# Patient Record
Sex: FEMALE | NOT HISPANIC OR LATINO | URBAN - METROPOLITAN AREA
[De-identification: names, ages, dates, MRNs, and addresses within clinical notes are randomized per-mention and may not be internally consistent; named-entity substitution may affect disease eponyms.]

---

## 2019-01-24 ENCOUNTER — APPOINTMENT (RX ONLY)
Dept: URBAN - METROPOLITAN AREA CLINIC 39 | Facility: CLINIC | Age: 29
Setting detail: DERMATOLOGY
End: 2019-01-24

## 2019-01-24 DIAGNOSIS — D22 MELANOCYTIC NEVI: ICD-10-CM

## 2019-01-24 DIAGNOSIS — L50.8 OTHER URTICARIA: ICD-10-CM

## 2019-01-24 PROBLEM — K21.9 GASTRO-ESOPHAGEAL REFLUX DISEASE WITHOUT ESOPHAGITIS: Status: ACTIVE | Noted: 2019-01-24

## 2019-01-24 PROBLEM — J45.909 UNSPECIFIED ASTHMA, UNCOMPLICATED: Status: ACTIVE | Noted: 2019-01-24

## 2019-01-24 PROBLEM — D22.5 MELANOCYTIC NEVI OF TRUNK: Status: ACTIVE | Noted: 2019-01-24

## 2019-01-24 PROBLEM — L29.8 OTHER PRURITUS: Status: ACTIVE | Noted: 2019-01-24

## 2019-01-24 PROBLEM — L70.0 ACNE VULGARIS: Status: ACTIVE | Noted: 2019-01-24

## 2019-01-24 PROBLEM — F32.9 MAJOR DEPRESSIVE DISORDER, SINGLE EPISODE, UNSPECIFIED: Status: ACTIVE | Noted: 2019-01-24

## 2019-01-24 PROCEDURE — 99202 OFFICE O/P NEW SF 15 MIN: CPT

## 2019-01-24 NOTE — HPI: HIVES (URTICARIA)
How Severe Are Your Hives?: mild
Is This A New Presentation, Or A Follow-Up?: Hives
Additional History: It began last year after construction was done at her house. The allergist told her it was from her brothers dog where she was staying while the construction was being done. The allergist could not do needle testing. A blood test revealed a pet dander allergy.

## 2020-07-20 ENCOUNTER — APPOINTMENT (OUTPATIENT)
Age: 30
Setting detail: DERMATOLOGY
End: 2020-07-20

## 2020-07-20 VITALS — TEMPERATURE: 97.9 F

## 2020-07-20 DIAGNOSIS — L64.8 OTHER ANDROGENIC ALOPECIA: ICD-10-CM

## 2020-07-20 DIAGNOSIS — Z86.19 PERSONAL HISTORY OF OTHER INFECTIOUS AND PARASITIC DISEASES: ICD-10-CM | Status: RESOLVED

## 2020-07-20 DIAGNOSIS — D22 MELANOCYTIC NEVI: ICD-10-CM

## 2020-07-20 DIAGNOSIS — L50.8 OTHER URTICARIA: ICD-10-CM | Status: WELL CONTROLLED

## 2020-07-20 DIAGNOSIS — Z41.9 ENCOUNTER FOR PROCEDURE FOR PURPOSES OTHER THAN REMEDYING HEALTH STATE, UNSPECIFIED: ICD-10-CM

## 2020-07-20 DIAGNOSIS — L90.5 SCAR CONDITIONS AND FIBROSIS OF SKIN: ICD-10-CM

## 2020-07-20 PROBLEM — D22.5 MELANOCYTIC NEVI OF TRUNK: Status: ACTIVE | Noted: 2020-07-20

## 2020-07-20 PROCEDURE — ? TREATMENT REGIMEN

## 2020-07-20 PROCEDURE — ? RECOMMENDATIONS

## 2020-07-20 PROCEDURE — ? ORDER TESTS

## 2020-07-20 PROCEDURE — 99202 OFFICE O/P NEW SF 15 MIN: CPT

## 2020-07-20 PROCEDURE — ? COUNSELING

## 2020-07-20 PROCEDURE — ? MEDICAL CONSULTATION: LHR

## 2020-07-20 PROCEDURE — ? ALMA LASER

## 2020-07-20 PROCEDURE — ? ADDITIONAL NOTES

## 2020-07-20 ASSESSMENT — LOCATION SIMPLE DESCRIPTION DERM
LOCATION SIMPLE: ANTERIOR SCALP
LOCATION SIMPLE: RIGHT LOWER BACK
LOCATION SIMPLE: CHEST
LOCATION SIMPLE: RIGHT PRETIBIAL REGION
LOCATION SIMPLE: SUBMENTAL CHIN
LOCATION SIMPLE: LEFT FOREARM
LOCATION SIMPLE: ABDOMEN
LOCATION SIMPLE: RIGHT UPPER BACK
LOCATION SIMPLE: GROIN

## 2020-07-20 ASSESSMENT — LOCATION DETAILED DESCRIPTION DERM
LOCATION DETAILED: LEFT PROXIMAL DORSAL FOREARM
LOCATION DETAILED: RIGHT PROXIMAL PRETIBIAL REGION
LOCATION DETAILED: SUBMENTAL CHIN
LOCATION DETAILED: MONS PUBIS
LOCATION DETAILED: MID-FRONTAL SCALP
LOCATION DETAILED: RIGHT LATERAL SUPERIOR CHEST
LOCATION DETAILED: RIGHT MID-UPPER BACK
LOCATION DETAILED: PERIUMBILICAL SKIN
LOCATION DETAILED: RIGHT INFERIOR LATERAL LOWER BACK

## 2020-07-20 ASSESSMENT — LOCATION ZONE DERM
LOCATION ZONE: ARM
LOCATION ZONE: SCALP
LOCATION ZONE: LEG
LOCATION ZONE: TRUNK
LOCATION ZONE: FACE
LOCATION ZONE: TRUNK
LOCATION ZONE: VULVA

## 2020-07-20 NOTE — PROCEDURE: TREATMENT REGIMEN
Detail Level: Zone
Otc Regimen: Rogaine 5% foam once daily
Plan: Patient will try OTC foam/ solution first. She may call for  prescription ointment form.
Otc Regimen: Antihistamine daily.

## 2020-07-20 NOTE — PROCEDURE: RECOMMENDATIONS
Recommendation Preamble: The following recommendations were made during the visit: silicone gel OTC
Detail Level: Zone

## 2020-07-20 NOTE — HPI: COSMETIC CONSULTATION
When Outside In The Sun, Do You...: mostly tans, rarely burns
Additional History: H/o Chronic Urticaria, Dr. Elliott recommended LHR test spot. \\n

## 2020-07-20 NOTE — PROCEDURE: ALMA LASER
Repetition Rate (Hz): 2
Number Of Pulses: 1
Post-Care To Use?: Generic
Matrix Dots: 7 x 7
Accent Post-Care: Post care reviewed with patient.
Treatment Fluence In Mj: 10
Post-Care Instructions: I reviewed with the patient in detail post-care instructions. Patient should avoid sunlight and wear sun protection.
Device Serial Number (Optional): soprano ice
Consent: Written consent obtained, risks reviewed including but not limited to crusting, scabbing, blistering, scarring, darker or lighter pigmentary change, systemic reactions, ulceration, incidental hair removal, bruising, and/or incomplete removal.
External Cooling Fan Speed: 5
Ipl Post-Care: Vaseline and ice applied. Post care reviewed with patient.
Pulse Mode (Optional): L
Fluence (J/Cm2): 23
Frequency In Hz: Unibody
Kaleb Post-Care: Immediate endpoint whitening and pinpoint bleeding. Vaseline and ice applied. Post care reviewed with patient.
Pulse Type: IV
Energy In Mj: 400
Clear Lift Post-Care: Post care reviewed with patient. Patient should avoid direct sunlight and wear broad spectrum sunscreen daily.
Detail Level: Zone
Energy (Mj/P): 600
Handpiece: Bipolar
Spot Size In Mm: 4
Price (Use Numbers Only, No Special Characters Or $): 0
Pulse Width: 30 ms
Power (Link): Low (10w)

## 2020-07-20 NOTE — PROCEDURE: ADDITIONAL NOTES
Detail Level: Simple
Additional Notes: Patient had appropriate w/u with allergist
Additional Notes: Scalp biopsy d/w patient, patient will consider in the future

## 2024-07-18 ENCOUNTER — HOSPITAL ENCOUNTER (OUTPATIENT)
Facility: HOSPITAL | Age: 34
Setting detail: RECURRING SERIES
Discharge: HOME | End: 2024-07-21
Payer: COMMERCIAL

## 2024-07-18 PROCEDURE — 97112 NEUROMUSCULAR REEDUCATION: CPT

## 2024-07-18 PROCEDURE — 97162 PT EVAL MOD COMPLEX 30 MIN: CPT

## 2024-07-18 PROCEDURE — 97535 SELF CARE MNGMENT TRAINING: CPT

## 2024-08-07 ENCOUNTER — APPOINTMENT (OUTPATIENT)
Facility: HOSPITAL | Age: 34
End: 2024-08-07
Payer: COMMERCIAL

## 2024-08-15 ENCOUNTER — HOSPITAL ENCOUNTER (OUTPATIENT)
Facility: HOSPITAL | Age: 34
Setting detail: RECURRING SERIES
Discharge: HOME | End: 2024-08-18
Payer: COMMERCIAL

## 2024-08-15 PROCEDURE — 97112 NEUROMUSCULAR REEDUCATION: CPT

## 2024-08-15 NOTE — PROGRESS NOTES
PHYSICAL THERAPY - DAILY TREATMENT NOTE (updated 3/23)      Date: 8/15/2024          Patient Name:  Amy Mcconnell :  1990   Medical   Diagnosis:  Urinary incontinence [R32] Treatment Diagnosis:  N39.41  URGE INCONTINENCE    Referral Source:  Tyron Haq PA-C Insurance:   Payor: [facility] / Plan: [facility] / Product Type: *No Product type* /                     Patient  verified yes     Visit #   Current  / Total 2 12   Time   In / Out 930 1015   Total Treatment Time 45   Total Timed Codes 45         SUBJECTIVE    Pain Level (0-10 scale): 0    Any medication changes, allergies to medications, adverse drug reactions, diagnosis change, or new procedure performed?: [x] No    [] Yes (see summary sheet for update)  Medications: Verified on Patient Summary List    Subjective functional status/changes:     Waking up with lower back pain.  Feeling better during the day, standing more and not crossing legs.  Not using as much of a heel lift.  Using pysllium and squatty potty and magnesium citrate.  Stool has improved to type 4-6 on bristol stool chart.   She is spacing her bladder voids to > 1hour.  She has not had any incontinence episodes over the past month.    OBJECTIVE      Therapeutic Procedures:  Tx Min Billable or 1:1 Min (if diff from Tx Min) Procedure, Rationale, Specifics   45  41382 Neuromuscular Re-Education (timed):  improve balance, coordination, kinesthetic sense, posture, core stability and proprioception to improve patient's ability to develop conscious control of individual muscles and awareness of position of extremities in order to progress to PLOF and address remaining functional goals. (see flow sheet as applicable)     Details if applicable:     45     Total Total                [x]  Patient Education billed concurrently with other procedures   [x] Review HEP    [] Progressed/Changed HEP, detail:    [] Other detail:         Other Objective/Functional Measures  Demonstrates good

## 2024-08-15 NOTE — PROGRESS NOTES
[facility] at [place],   a part of [address], [address]  [place] [address]  Phone: [phone]  Fax: [phone]  PHYSICAL THERAPY PROGRESS NOTE  Patient Name:  Amy Mcconnell :  1990   Treatment/Medical Diagnosis: Urinary incontinence [R32]   Referral Source:  Tyron Haq PA-C     Date of Initial Visit:  24 Attended Visits:  2 Missed Visits:  0     SUMMARY OF TREATMENT/ASSESSMENT:  Treatment Plan may include any combination of the followin Therapeutic Exercise, 62100 Neuromuscular Re-Education, 55537 Manual Therapy, 81693 Therapeutic Activity, 04806 Self Care/Home Management, 87937 Electrical Stim unattended, 77524 Gait Training, 90944 Ultrasound, 58317 Mechanical Traction, and (Elective Self Pay) Needle Insertion w/o Injection (1 or 2 muscles), (3+ muscles)        CURRENT STATUS/GOALS    Short Term Goals: To be accomplished in 6 treatments. 25% Progress towards all goals, limited by frequency of visits    Patient will demonstrate Negative Hruska Adduction Drop Test (Brooks Test) to allow for sit to stand transfer without compensation or assistance needed   Patient will demonstrate independence with HEP without v.c. to allow for car transfers and showering without compensation or pain  Patient will demonstrate greater than Grade II on Hruska Adduction Lift Test to demonstrate ambulation of a minimum of 500 ft with min compensatory deviations and v.c. needed for form correction   Long Term Goals: To be accomplished in 12 treatments.  Patient will demonstrate Grade IV or Grade V Hruska Adduction Lift Score to allow for ambulation x 500 ft and mobility in home and community settings x 250 ft  Pt will demonstrate shoulder, neck and hip A/PROM bilaterally equal and without pain to allow for overhead reaching to a high shelf and a sit to stand transfer without UE assistance needed  Pt will demonstrate independence

## 2024-08-19 ENCOUNTER — HOSPITAL ENCOUNTER (OUTPATIENT)
Facility: HOSPITAL | Age: 34
Setting detail: RECURRING SERIES
Discharge: HOME | End: 2024-08-22
Payer: COMMERCIAL

## 2024-08-19 PROCEDURE — 97112 NEUROMUSCULAR REEDUCATION: CPT

## 2024-08-19 NOTE — PROGRESS NOTES
PT / OT services to modify and progress therapeutic interventions, analyze and address functional mobility deficits, analyze and address ROM deficits, analyze and address strength deficits, analyze and address soft tissue restrictions, analyze and cue for proper movement patterns, and analyze and modify for postural abnormalities to address functional deficits and attain remaining goals.    Progress toward goals / Updated goals:  [x]  See Progress Note/Recertification          PLAN  Yes  Continue plan of care  Re-Cert Due: 8/16/24  []  Upgrade activities as tolerated  []  Discharge due to:  []  Other:      Hernan Plascencia, PT       8/19/2024       3:02 PM

## 2024-08-26 ENCOUNTER — HOSPITAL ENCOUNTER (OUTPATIENT)
Facility: HOSPITAL | Age: 34
Setting detail: RECURRING SERIES
Discharge: HOME | End: 2024-08-29
Payer: COMMERCIAL

## 2024-08-26 PROCEDURE — 97112 NEUROMUSCULAR REEDUCATION: CPT

## 2024-08-26 NOTE — PROGRESS NOTES
PHYSICAL THERAPY - DAILY TREATMENT NOTE (updated 3/23)      Date: 2024          Patient Name:  Amy Mcconnell :  1990   Medical   Diagnosis:  Urinary incontinence [R32] Treatment Diagnosis:  N39.41  URGE INCONTINENCE    Referral Source:  Tyron Haq PA-C Insurance:   Payor: [employer] / Plan: [facility] / Product Type: *No Product type* /                     Patient  verified yes     Visit #   Current  / Total 4 12   Time   In / Out 300 345   Total Treatment Time 45   Total Timed Codes 45         SUBJECTIVE    Pain Level (0-10 scale): 0    Any medication changes, allergies to medications, adverse drug reactions, diagnosis change, or new procedure performed?: [x] No    [] Yes (see summary sheet for update)  Medications: Verified on Patient Summary List    Subjective functional status/changes:       No problems with bowel, bladder frequency continues to improve.  No abdominal pain.  Still having low back pain with bending and squatting.    OBJECTIVE      Therapeutic Procedures:  Tx Min Billable or 1:1 Min (if diff from Tx Min) Procedure, Rationale, Specifics   45  50511 Neuromuscular Re-Education (timed):  improve balance, coordination, kinesthetic sense, posture, core stability and proprioception to improve patient's ability to develop conscious control of individual muscles and awareness of position of extremities in order to progress to PLOF and address remaining functional goals. (see flow sheet as applicable)     Details if applicable:     45     Total Total                [x]  Patient Education billed concurrently with other procedures   [x] Review HEP    [] Progressed/Changed HEP, detail:    [] Other detail:         Other Objective/Functional Measures  Patient presents with walking boot for stress fracture.  Made adjustments for shoe and posture to help reduce pain and gait compensation.    Pain Level at end of session (0-10 scale): 0      Assessment     Patient will continue to benefit  from skilled PT / OT services to modify and progress therapeutic interventions, analyze and address functional mobility deficits, analyze and address ROM deficits, analyze and address strength deficits, analyze and address soft tissue restrictions, analyze and cue for proper movement patterns, and analyze and modify for postural abnormalities to address functional deficits and attain remaining goals.    Progress toward goals / Updated goals:  []  See Progress Note/Recertification    Conitnues to progress towards all goals at this time      PLAN  Yes  Continue plan of care  Re-Cert Due: 8/16/24  []  Upgrade activities as tolerated  []  Discharge due to:  []  Other:      Hernan Plascencia, PT       8/26/2024       3:08 PM

## 2024-09-04 ENCOUNTER — HOSPITAL ENCOUNTER (OUTPATIENT)
Facility: HOSPITAL | Age: 34
Setting detail: RECURRING SERIES
Discharge: HOME | End: 2024-09-07
Payer: COMMERCIAL

## 2024-09-04 PROCEDURE — 97112 NEUROMUSCULAR REEDUCATION: CPT

## 2024-09-04 NOTE — PROGRESS NOTES
PHYSICAL THERAPY - DAILY TREATMENT NOTE (updated 3/23)      Date: 2024          Patient Name:  Amy Mcconnell :  1990   Medical   Diagnosis:  Urinary incontinence [R32] Treatment Diagnosis:  N39.41  URGE INCONTINENCE    Referral Source:  Tyron Haq PA-C Insurance:   Payor: [facility] / Plan: [facility] / Product Type: *No Product type* /                     Patient  verified yes     Visit #   Current  / Total 5 12   Time   In / Out 300 338   Total Treatment Time 38   Total Timed Codes 38         SUBJECTIVE    Pain Level (0-10 scale): 0    Any medication changes, allergies to medications, adverse drug reactions, diagnosis change, or new procedure performed?: [x] No    [] Yes (see summary sheet for update)  Medications: Verified on Patient Summary List    Subjective functional status/changes:       Bladder frequency continues to improve.  Low back is feeling much better this.     OBJECTIVE      Therapeutic Procedures:  Tx Min Billable or 1:1 Min (if diff from Tx Min) Procedure, Rationale, Specifics   38  92374 Neuromuscular Re-Education (timed):  improve balance, coordination, kinesthetic sense, posture, core stability and proprioception to improve patient's ability to develop conscious control of individual muscles and awareness of position of extremities in order to progress to PLOF and address remaining functional goals. (see flow sheet as applicable)     Details if applicable:     38     Total Total                [x]  Patient Education billed concurrently with other procedures   [x] Review HEP    [] Progressed/Changed HEP, detail:    [] Other detail:         Other Objective/Functional Measures    Tolerated progress to sitting and standing, able to feel left abs and IC adductor.      Pain Level at end of session (0-10 scale): 0      Assessment     Patient will continue to benefit from skilled PT / OT services to modify and progress therapeutic interventions, analyze and address functional

## 2024-09-11 ENCOUNTER — HOSPITAL ENCOUNTER (OUTPATIENT)
Facility: HOSPITAL | Age: 34
Setting detail: RECURRING SERIES
Discharge: HOME | End: 2024-09-14
Payer: COMMERCIAL

## 2024-09-11 PROCEDURE — 97112 NEUROMUSCULAR REEDUCATION: CPT

## 2024-09-23 ENCOUNTER — HOSPITAL ENCOUNTER (OUTPATIENT)
Facility: HOSPITAL | Age: 34
Setting detail: RECURRING SERIES
Discharge: HOME | End: 2024-09-26
Payer: COMMERCIAL

## 2024-09-23 PROCEDURE — 97112 NEUROMUSCULAR REEDUCATION: CPT

## 2025-05-17 NOTE — THERAPY EVALUATION
pain.  She has been having low back for the past year which has come on in the past year.  She reports that she has a leg length discrepancy and wears a heel lift in her right.    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)  Living Situation: single  Mobility: wfl  Self Care: wfl  Previous Treatment/Compliance: Progress PT 1 year ago  PMHx/Surgical Hx/Comorbidites: see chart  Prior Hospitalization:  n/a  Barriers: []pain []Financial []time []transportation []Other:  Substance use: []Alcohol []Tobacco []other:   Pt Goals: to reduce constipation and UI  Motivation: yes  Cognition: A & O x 4               OBJECTIVE    Posture:  notable left hip hike/leg length discrepancy in standing, right knee flexed in resting posture  Other Observations:  Femur measuring from greater trochanter to medial condyle less than .5 cm discrepancy  Gait and Functional Mobility:  Functional squat level 2  Palpation: TTP along right paraspinals, increase rib flare on left        Lumbar AROM: wfl Cervical AROM:          LOWER QUARTER   MUSCLE STRENGTH  WFL      UPPER QUARTER   MUSCLE STRENGTH  WFL            MMT: TrA: 4/4  Neurological: Reflexes / Sensations: wfl  Special Tests:   +PADT left   +ADT left   addLT: 1/5   HGIR+ R       Objective/Functional Outcome Measure: -  FOTO Score: -  FOTO score = an established functional score where 100 = no disability      30 min [x]Eval - untimed                        Therapeutic Procedures:  Tx Min Billable or 1:1 Min (if diff from Tx Min) Procedure, Rationale, Specifics   15  17661 Neuromuscular Re-Education (timed):  improve balance, coordination, kinesthetic sense, posture, core stability and proprioception to improve patient's ability to develop conscious control of 
show